# Patient Record
Sex: FEMALE | Race: WHITE
[De-identification: names, ages, dates, MRNs, and addresses within clinical notes are randomized per-mention and may not be internally consistent; named-entity substitution may affect disease eponyms.]

---

## 2019-08-28 NOTE — HP
PREOPERATIVE HISTORY AND PHYSICAL:

 

DATE OF SURGERY/ADMISSION:  08/30/19

 

DATE OF OFFICE VISIT/ENCOUNTER:  08/28/19

 

ATTENDING SURGEON:  Reanna Rincon MD * (DICTATED BY HOLGER GOLDEN)

 

PROCEDURE:  Right wrist de Quervain's release.

 

HISTORY OF PRESENT ILLNESS:  This is a 58-year-old female who complains of pain 
at the radial aspect of her right wrist.  This has been ongoing since fall of 2018. She is employed as a  person and does a lot of typing and this 
is aggravating.  She has failed conservative treatment including bracing and 
cortisone injections and has now consented to proceed with surgical 
intervention for this problem.

 

PAST MEDICAL HISTORY:

1.  Hypertension.

2.  Hyperlipidemia.

3.  History of diverticulitis.

4.  Osteoarthritis.

5.  Migraine headaches.

6.  Thyroid goiter.

7.  Sleep apnea, she does not wear a CPAP.

 

PAST SURGICAL HISTORY:

1.  D and C.

2.  Bilateral carpal tunnel releases.

3.  Removal of an ovary.

4.  Hysterectomy.

 

CURRENT MEDICATIONS:

1.  Amitriptyline HCl 10 mg 3 tabs daily.

2.  Atenolol 30 mg daily.

3.  Pravastatin sodium 10 mg daily.

 

ALLERGIES:  ADHESIVE causes a skin reaction and QUININE causes fevers, chills, 
nausea, vomiting.

 

FAMILY MEDICAL HISTORY:  Diabetes, heart disease, hypertension, and cancer.

 

SOCIAL HISTORY:  The patient is employed as a  at [employer].  
She is a former smoker, she quit in 1984; prior to that, she smoked a pack a 
day for 7 years.  She denies recreational drug use and does not drink alcohol.

 

REVIEW OF SYSTEMS:  Negative for general, cephalic, cardiovascular, respiratory
, GI, , other musculoskeletal, integumentary, endocrine, neurologic, and 
hematologic symptoms.  Infectious Disease:  Negative for MRSA, hepatitis C, HIV.

 

                               PHYSICAL EXAMINATION

 

GENERAL:  Well-developed, well-nourished 58-year-old female, in no acute 
distress.

 

VITAL SIGNS:  Height 5 feet 4 inches, weight 280 pounds, pulse rate 68, blood 
pressure 128/76.

 

HEENT:  Normocephalic and atraumatic.  Pupils are equal, round, and reactive to 
light and accommodation.  Extraocular movements are intact.  Throat is clear.

 

NECK:  Supple.  No palpable lymph nodes.

 

PULMONARY:  Lungs are clear to auscultation bilaterally.  No wheezes, rales, or 
rhonchi.

 

CARDIOVASCULAR:  Regular rate and rhythm.  S1 and S2.  No murmurs, rubs, or 
gallops.  No edema.

 

ABDOMEN:  Positive bowel sounds.  Soft and nontender.

 

NEUROLOGIC:  Alert and oriented x3.  Cranial nerves II through XII are intact. 
Sensation is intact to light touch.

 

MUSCULOSKELETAL:  On exam of her right wrist, there is no visible swelling.  
Skin is intact.  She has tenderness to palpation at the radial styloid and over 
the first dorsal compartment.  Mild increase in pain with some extension and 
passive wrist ulnar deviation.  She has a positive Finkelstein test.  
Neurovascular function is intact.

 

 IMPRESSION:  Right wrist de Quervain's tenosynovitis.

 

PLAN/RECOMMENDATIONS:  The patient is scheduled to undergo a right wrist de 
Quervain's release with Dr. Rincon on 08/30/19.  She will return to the office 
10 days postop for followup and suture removal.  A prescription for Ultracet 
was e- scribed to the patient's pharmacy for postoperative pain management.

 

 ____________________________________ HOLGER GOLDEN

 

[id number]/CPS #: [id number]

[id number]

## 2019-08-30 ENCOUNTER — HOSPITAL ENCOUNTER (OUTPATIENT)
Dept: HOSPITAL 25 - OR | Age: 59
Discharge: HOME | End: 2019-08-30
Attending: ORTHOPAEDIC SURGERY
Payer: COMMERCIAL

## 2019-08-30 VITALS — DIASTOLIC BLOOD PRESSURE: 67 MMHG | SYSTOLIC BLOOD PRESSURE: 119 MMHG

## 2019-08-30 DIAGNOSIS — E78.5: ICD-10-CM

## 2019-08-30 DIAGNOSIS — I10: ICD-10-CM

## 2019-08-30 DIAGNOSIS — E66.01: ICD-10-CM

## 2019-08-30 DIAGNOSIS — M19.90: ICD-10-CM

## 2019-08-30 DIAGNOSIS — Z87.891: ICD-10-CM

## 2019-08-30 DIAGNOSIS — G47.30: ICD-10-CM

## 2019-08-30 DIAGNOSIS — M65.4: Primary | ICD-10-CM

## 2019-08-30 NOTE — OP
CC:  Dr. Reanna Rincon*

 

OPERATIVE PROCEDURE:

 

DATE OF OPERATION:  08/30/19 - \[facility]\""

 

DATE OF BIRTH:  [birth year]

 

SURGEON:  Reanna Rincon MD

 

ASSISTANT:  HOLGER Curtis



ANESTHESIOLOGIST:  Helen Taveras MD

 

ANESTHESIA:  Local MAC.

 

PRE-OP DIAGNOSIS:  De Quervain tenosynovitis on the right.

 

POST-OP DIAGNOSIS:  De Quervain tenosynovitis on the right.

 

OPERATIVE PROCEDURE:  Right de Quervain's release.

 

ESTIMATED BLOOD LOSS:  Zero.

 

TOURNIQUET TIME:  About 10 minutes.

 

INDICATIONS:  Nila is a 58-year-old female who has pain on the radial 
aspect of her right wrist.  She has failed conservative treatment with 
cortisone and presents now for de Quervain's release on the right.

 

DESCRIPTION OF PROCEDURE:  The patient was brought into the operating room, was 
given a sedation anesthetic and a local infiltration of 10 cc of 1% plain 
lidocaine at the radial aspect of her right wrist.  The skin of her left hand 
and forearm was prepped and draped in the usual sterile fashion.  The hand and 
forearm were exsanguinated and the tourniquet elevated to 250 mmHg.  A 
longitudinal incision was made centered at the tip of the radial styloid and we 
dissected through the subcutaneous tissue down to the first dorsal compartment.
  The compartment was incised longitudinally completely releasing the APL and 
EPB tendons which were in separate compartments.  The wound was irrigated and 
the skin edges were reapproximated with 4-0 nylon suture.  The wound was 
dressed with Xeroform, 4x4, Webril, and an Ace wrap.  The patient tolerated the 
procedure well and was brought to the recovery room in good condition.

 

 [id number]/[facility] #: [id number]

[facility]

## 2019-09-10 ENCOUNTER — HOSPITAL ENCOUNTER (EMERGENCY)
Dept: HOSPITAL 25 - ED | Age: 59
LOS: 1 days | Discharge: HOME | End: 2019-09-11
Payer: COMMERCIAL

## 2019-09-10 DIAGNOSIS — G43.909: ICD-10-CM

## 2019-09-10 DIAGNOSIS — L03.211: ICD-10-CM

## 2019-09-10 DIAGNOSIS — K05.219: Primary | ICD-10-CM

## 2019-09-10 DIAGNOSIS — Z88.8: ICD-10-CM

## 2019-09-10 DIAGNOSIS — Z87.891: ICD-10-CM

## 2019-09-10 DIAGNOSIS — I10: ICD-10-CM

## 2019-09-10 DIAGNOSIS — Z91.048: ICD-10-CM

## 2019-09-10 LAB
ALBUMIN SERPL BCG-MCNC: 3.9 G/DL (ref 3.2–5.2)
ALBUMIN/GLOB SERPL: 1.1 {RATIO} (ref 1–3)
ALP SERPL-CCNC: 68 U/L (ref 34–104)
ALT SERPL W P-5'-P-CCNC: 14 U/L (ref 7–52)
ANION GAP SERPL CALC-SCNC: 7 MMOL/L (ref 2–11)
AST SERPL-CCNC: 18 U/L (ref 13–39)
BASOPHILS # BLD AUTO: 0.1 10^3/UL (ref 0–0.2)
BUN SERPL-MCNC: 14 MG/DL (ref 6–24)
BUN/CREAT SERPL: 17.5 (ref 8–20)
CALCIUM SERPL-MCNC: 9.3 MG/DL (ref 8.6–10.3)
CHLORIDE SERPL-SCNC: 98 MMOL/L (ref 101–111)
EOSINOPHIL # BLD AUTO: 0.1 10^3/UL (ref 0–0.6)
GLOBULIN SER CALC-MCNC: 3.7 G/DL (ref 2–4)
GLUCOSE SERPL-MCNC: 123 MG/DL (ref 70–100)
HCO3 SERPL-SCNC: 29 MMOL/L (ref 22–32)
HCT VFR BLD AUTO: 45 % (ref 35–47)
HGB BLD-MCNC: 15.2 G/DL (ref 12–16)
LYMPHOCYTES # BLD AUTO: 2 10^3/UL (ref 1–4.8)
MCH RBC QN AUTO: 30 PG (ref 27–31)
MCHC RBC AUTO-ENTMCNC: 34 G/DL (ref 31–36)
MCV RBC AUTO: 89 FL (ref 80–97)
MONOCYTES # BLD AUTO: 1.1 10^3/UL (ref 0–0.8)
NEUTROPHILS # BLD AUTO: 8.6 10^3/UL (ref 1.5–7.7)
NRBC # BLD AUTO: 0 10^3/UL
NRBC BLD QL AUTO: 0
PLATELET # BLD AUTO: 296 10^3/UL (ref 150–450)
POTASSIUM SERPL-SCNC: 3.6 MMOL/L (ref 3.5–5)
PROT SERPL-MCNC: 7.6 G/DL (ref 6.4–8.9)
RBC # BLD AUTO: 5.1 10^6 /UL (ref 3.7–4.87)
SODIUM SERPL-SCNC: 134 MMOL/L (ref 135–145)
WBC # BLD AUTO: 11.9 10^3/UL (ref 3.5–10.8)

## 2019-09-10 PROCEDURE — 87040 BLOOD CULTURE FOR BACTERIA: CPT

## 2019-09-10 PROCEDURE — 96361 HYDRATE IV INFUSION ADD-ON: CPT

## 2019-09-10 PROCEDURE — 85025 COMPLETE CBC W/AUTO DIFF WBC: CPT

## 2019-09-10 PROCEDURE — 70487 CT MAXILLOFACIAL W/DYE: CPT

## 2019-09-10 PROCEDURE — 36415 COLL VENOUS BLD VENIPUNCTURE: CPT

## 2019-09-10 PROCEDURE — 80053 COMPREHEN METABOLIC PANEL: CPT

## 2019-09-10 PROCEDURE — 96375 TX/PRO/DX INJ NEW DRUG ADDON: CPT

## 2019-09-10 PROCEDURE — 96374 THER/PROPH/DIAG INJ IV PUSH: CPT

## 2019-09-10 PROCEDURE — 83605 ASSAY OF LACTIC ACID: CPT

## 2019-09-10 PROCEDURE — 86140 C-REACTIVE PROTEIN: CPT

## 2019-09-10 PROCEDURE — 99283 EMERGENCY DEPT VISIT LOW MDM: CPT

## 2019-09-10 NOTE — ED
Throat Pain/Nasal Congestion





- HPI Summary


HPI Summary: 





Patient complains of redness and swelling along the right side jaw and chin 5 

days.  Seen by PCP and placed on amoxicillin.  Patient has taken 3 days of 

amoxicillin with no improvement.  Patient evaluated by dentist who gave patient 

no clinical diagnosis.  Denies fever, cough, sore throat, CP, SOB, N/V/V 

abdominal pain, change in urine, change in BM.  Denies medical history.





- History of Current Complaint


Chief Complaint: EDDentalPain


Time Seen by Provider: 09/10/19 22:53


Hx Obtained From: Patient


Onset/Duration: Gradual Onset, Lasting Days


Severity: Severe


Associated Signs And Symptoms: Positive: Negative


Cough: None





- Allergies/Home Medications


Allergies/Adverse Reactions: 


 Allergies











Allergy/AdvReac Type Severity Reaction Status Date / Time


 


quinine Allergy Severe CHILLS, Verified 09/10/19 23:09





   VOMITING,  





   DIARRHEA  


 


Adhesive Tape Allergy Intermediate Rash Verified 09/10/19 23:09


 


CARDBOARD BOXES Allergy Severe RASH ON Uncoded 09/10/19 23:09





   ARMS AND  





   HANDS,  





   welts  














PMH/Surg Hx/FS Hx/Imm Hx


Endocrine/Hematology History: 


   Denies: Hx Anticoagulant Therapy


Cardiovascular History: Reports: Hx Hypertension


Respiratory History: Reports: Hx Sleep Apnea


GI History: Reports: Other GI Disorders - DIVERTICULITIS


Musculoskeletal History: Reports: Hx Arthritis - LOWER BACK, LEFT ANKLE, 

BILATERAL WRIST, Hx Tendonitis - right wrist


Sensory History: 


   Denies: Hx Contacts or Glasses, Hx Hearing Aid


Opthamlomology History: 


   Denies: Hx Contacts or Glasses


EENT History: 


   Denies: Hx Deafness


Neurological History: Reports: Hx Migraine - ON MEDICAITON





- Cancer History


Hx Chemotherapy: No


Hx Radiation Therapy: No





- Surgical History


Surgery Procedure, Year, and Place: 2011 LAPAROSCOPIC HYSTERECTOMY, [place].  

SEVERAL DILATION AND CURETTAGE, X 8 - [facility].  1999 RIGHT CARPAL TUNNEL RELEASE, 

[facility].  1999 RIGHT OOPHORECTOMY, [facility].  left carpal tunnel release approx 2012


Hx Anesthesia Reactions: No


Infectious Disease History: No


Infectious Disease History: 


   Denies: Traveled Outside the [place] in Last 30 Days





- Family History


Known Family History: Positive: Non-Contributory





- Social History


Alcohol Use: Rare


Substance Use Type: Reports: None


Smoking Status (MU): Former Smoker


Amount Used/How Often: 6-7 years 1ppd





Review of Systems


Constitutional: Negative


Eyes: Negative


ENT: Negative


Cardiovascular: Negative


Respiratory: Negative


Gastrointestinal: Negative


Genitourinary: Negative


Musculoskeletal: Negative


Skin: Negative


Neurological: Negative


Psychological: Normal


All Other Systems Reviewed And Are Negative: Yes





Physical Exam





- Summary


Physical Exam Summary: 





Extensive Redness and swelling along right side jaw, right-sided chin and under 

chin.  Full range of motion of jaw.  No evidence of intraoral lesions or apical 

abscess.


Triage Information Reviewed: Yes


Vital Signs On Initial Exam: 


 Initial Vitals











Temp Pulse Resp BP Pulse Ox


 


 100.4 F   98   18   120/98   94 


 


 09/10/19 20:48  09/10/19 20:48  09/10/19 20:48  09/10/19 20:48  09/10/19 20:48











Vital Signs Reviewed: Yes


Appearance: Positive: Well-Appearing


Skin: Positive: Warm


Head/Face: Positive: Normal Head/Face Inspection


Eyes: Positive: Normal


ENT: Positive: Normal ENT inspection


Neck: Positive: Supple


Respiratory/Lung Sounds: Positive: Clear to Auscultation


Cardiovascular: Positive: Normal


Abdomen Description: Positive: Nontender


Musculoskeletal: Positive: Normal


Neurological: Positive: Normal


Psychiatric: Positive: Normal


AVPU Assessment: Alert





- Barbara Coma Scale


Best Eye Response: 4 - Spontaneous


Best Motor Response: 6 - Obeys Commands


Best Verbal Response: 5 - Oriented


Coma Scale Total: 15





Diagnostics





- Vital Signs


 Vital Signs











  Temp Pulse Resp BP Pulse Ox


 


 09/10/19 20:48  100.4 F  98  18  120/98  94














- Laboratory


Result Diagrams: 


 09/10/19 23:15





 09/10/19 23:15


Lab Statement: Any lab studies that have been ordered have been reviewed, and 

results considered in the medical decision making process.





EENT Course/Dx





- Course


Course Of Treatment: Patient complains of redness and swelling along the right 

side jaw and chin 5 days.  Seen by PCP and placed on amoxicillin.  Patient has 

taken 3 days of amoxicillin with no improvement.  Patient evaluated by dentist 

who gave patient no clinical diagnosis.  Denies fever, cough, sore throat, CP, 

SOB, N/V/V abdominal pain, change in urine, change in BM.  Denies medical 

history.  Febrile at 100.4.  Heart rate 98.  Vital signs otherwise within 

normal limits.  WBC 11.9.  .  Labs otherwise unremarkable.  CT 

maxillofacial positive for inflammatory fat stranding in the region of the chin 

including a right submandibular space surrounding the submandibular gland 

suspicious for cellulitis but cannot exclude right submandibular siladenitis.  

No visible discrete abscess.  There is periapical lucency surrounding the right 

second mandibular molar tooth consistent with periodontal disease/periodontist 

abscess.  Discussed patient with attending Dr. Chen who agreed patient could 

be discharged with by mouth clindamycin.  Patient started here in the ED on 

clindamycin by mouth.  Advised to follow-up with dentist.  Patient understands 

and approves of plan.





- Diagnoses


Provider Diagnoses: 


 Periodontal abscess, Cellulitis








Discharge ED





- Sign-Out/Discharge


Documenting (check all that apply): Patient Departure


Patient Received Moderate/Deep Sedation with Procedure: No





- Discharge Plan


Condition: Stable


Disposition: HOME


Prescriptions: 


Clindamycin HCl 450 mg PO TID 7 Days #21 capsule


Oxycodone HCl 5 mg PO TID 2 Days #6 tablet MDD 3 tabs


Patient Education Materials:  Dental Abscess (ED), Cellulitis (ED)


Referrals: 


Power Lamas MD [Primary Care Provider] - 


Additional Instructions: 


Take antibiotics as directed.  Follow up with your dentist.  Take oxycodone for 

pain.  Take ibuprofen with the oxycodone for anti-inflammatory properties.  

Return to the ED for any new or worsening symptoms.





- Billing Disposition and Condition


Condition: STABLE


Disposition: Home

## 2019-09-10 NOTE — XMS REPORT
Continuity of Care Document (CCD)

 Created on:2019



Patient:Nila Milner

Sex:Female

:1960

External Reference #:[id number]





Demographics







 Address  [address] [place] [address]

 

 Home Phone  [phone]

 

 Work Phone  [phone]

 

 Email Address  [email or web address]

 

 Preferred Language  en

 

 Marital Status  Not  or 

 

 Pentecostalism Affiliation  Unknown

 

 Race  White

 

 Ethnic Group  Not  or 









Author







 Name  Reanna Rincon M.D. (transmitted by agent of provider Quintin Hurley)

 

 Address  [address] [place] [address]



   [place] [address]









Care Team Providers







 Name  Role  Phone

 

 Power Lamas MD - Family Medicine  Care Team Information   [phone]
[phone]









Problems







 Active Problems  Provider  Date

 

 Migraine without aura, not refractory  Chito Eddy MD  Onset: 2016







Social History







 Type  Date  Description  Comments

 

 Birth Sex    Unknown  

 

 ETOH Use    Denies alcohol use  

 

 Tobacco Use  Start: Unknown End:  Patient is a former smoker  



   Unknown    

 

 Smoking Status  Reviewed: 19  Patient is a former smoker  

 

 Exercise Type/Frequency    Exercises sporadically  







Allergies, Adverse Reactions, Alerts







 Active Allergies  Reaction  Severity  Comments  Date

 

 Quinine        2018

 

 Adhesive        10/04/2018









 Inactive Allergies









 NKDA        2015







Medications







 Active Medications  SIG  Qnty  Indications  Ordering  Date



         Provider  

 

 Tramadol  1 tab by mouth  15tabs    Reanna Rincon,  2019



 Hydrochloride/Acetami  every 4-6 hours      MARCELLO soto  as needed pain        



      37.5-325mg          



 Tablets          



           

 

 Amitriptyline HCL  3 tabs by mouth  270tabs    Chito Eddy,  2018



                 10mg  at bedtime      MD  



 Tablets          



           

 

 Cefaly Kit  Dual Kit,as  1units    Chito Eddy,  2018



           Device  directed      MD  



           

 

 Pravastatin Sodium  1 by mouth every      Unknown  



                  10mg  day        



 Tablets          



           

 

 Atenolol  30mg      Unknown  

 

 Amoxicillin  take 4 pills, 2 g      Unknown  



           500mg  1 hour before        



 Capsules  dental or gi        



   procedure        







Medications Administered in Office







 Medication  SIG  Qnty  Indications  Ordering Provider  Date

 

 Depomedrol 40MG        NATHANIEL Curtis  2019



      Injection          

 

 Depomedrol 40MG        Dana Bitting, RPA-C  2018



      Injection          

 

 Depomedrol 40MG        Dana Bitting, CHRISTIANE-C  10/04/2018



      Injection          







Immunizations







 Description

 

 No Information Available







Vital Signs







 Date  Vital  Result  Comment

 

 2019  8:15am  Height  64 inches  5'4"









 Weight  280.00 lb  

 

 Heart Rate  94 /min  

 

 Body Temperature  99.0 F  

 

 O2 % BldC Oximetry  98 %  

 

 BMI (Body Mass Index)  48.1 kg/m2  









 2019  9:13am  Height  64 inches  5'4"









 Weight  280.00 lb  

 

 Heart Rate  68 /min  

 

 BP Systolic  128 mmHg  

 

 BP Diastolic  76 mmHg  

 

 BMI (Body Mass Index)  48.1 kg/m2  







Results







 Description

 

 No Information Available







Procedures







 Date  Code  Description  Status

 

 2019  07194  Inject Tendon Sheath Or Ligament Aponeurosis Eg Plantar  
Completed



     Fascia  







Medical Devices







 Description

 

 No Information Available







Encounters







 Type  Date  Location  Provider  Dx  Diagnosis

 

 Office Visit  2019  [facility]  Radial styloid



   9:15a  [facility]    tenosynovitis [de



           Quervain]







Assessments







 Date  Code  Description  Provider

 

 2019  [id number]  Radial styloid tenosynovitis [de Quervain]  Reanna Rincon M.D.

 

 2019  [facility]  Radial styloid tenosynovitis [de Quervain]  Reanna Rincon M.D.

 

 2019  M65.4  Radial styloid tenosynovitis [de Quervain]  NATHANIEL Curtis







Plan of Treatment

Future Appointment(s):10/09/2019  3:30 pm - [facility]con M.D. [facility] - Reanna Rincon M.D.M65.Zak Radial styloid 
tenosynovitis [de Quervain]Follow up:Follow up:   4 weeks



Functional Status







 Description

 

 No Information Available







Mental Status







 Description

 

 No Information Available







Referrals







 Description

 

 No Information Available

## 2019-09-10 NOTE — XMS REPORT
Continuity of Care Document (CCD)

 Created on:2019



Patient:Nila Milner

Sex:Female

:1960

External Reference #:[id number]





Demographics







 Address  [address] [place] [address]

 

 Home Phone  [phone]

 

 Work Phone  [phone]

 

 Email Address  [email or web address]

 

 Preferred Language  en

 

 Marital Status  Not  or 

 

 Mormon Affiliation  Unknown

 

 Race  White

 

 Ethnic Group  Not  or 









Author







 Name  Reanna Rincon M.D. (transmitted by agent of provider Tara Landrum)

 

 Address  [address] [place] [id number]









Care Team Providers







 Name  Role  Phone

 

 Power Lamas MD - Family Medicine  Care Team Information   [phone]
[phone]









Problems







 Active Problems  Provider  Date

 

 Migraine without aura, not refractory  Chito Eddy MD  Onset: 2016







Social History







 Type  Date  Description  Comments

 

 Birth Sex    Unknown  

 

 ETOH Use    Denies alcohol use  

 

 Tobacco Use  Start: Unknown End:  Patient is a former smoker  



   Unknown    

 

 Smoking Status  Reviewed: 19  Patient is a former smoker  

 

 Exercise Type/Frequency    Exercises sporadically  







Allergies, Adverse Reactions, Alerts







 Active Allergies  Reaction  Severity  Comments  Date

 

 Quinine        2018

 

 Adhesive        10/04/2018









 Inactive Allergies









 NKDA        2015







Medications







 Active Medications  SIG  Qnty  Indications  Ordering  Date



         Provider  

 

 Amitriptyline HCL  3 tabs by mouth  270tabs    Chito Eddy,  2018



                10mg  at bedtime      MD  



 Tablets          



           

 

 Cefaly Kit  Dual Kit,as  1units    Chito Eddy,  2018



          Device  directed      MD  



           

 

 Pravastatin Sodium  1 by mouth every      Unknown  



                 10mg  day        



 Tablets          



           

 

 Atenolol  30mg      Unknown  







Medications Administered in Office







 Medication  SIG  Qnty  Indications  Ordering Provider  Date

 

 Depomedrol 40MG        Dana Bitting, RPA-C  2019



      Injection          

 

 Depomedrol 40MG        Dana Bitting, RPA-C  2018



      Injection          

 

 Depomedrol 40MG        Dana Bitting, RPA-C  10/04/2018



      Injection          







Immunizations







 Description

 

 No Information Available







Vital Signs







 Date  Vital  Result  Comment

 

 2019  9:13am  Height  64 inches  5'4"









 Weight  280.00 lb  

 

 Heart Rate  68 /min  

 

 BP Systolic  128 mmHg  

 

 BP Diastolic  76 mmHg  

 

 BMI (Body Mass Index)  48.1 kg/m2  









 2019  2:56pm  Height  64 inches  5'4"









 Weight  281.00 lb  

 

 BP Systolic  122 mmHg  

 

 BP Diastolic  84 mmHg  

 

 Pain Level  7  upon exertion

 

 BMI (Body Mass Index)  48.2 kg/m2  







Results







 Description

 

 No Information Available







Procedures







 Date  Code  Description  Status

 

 2019  06209  Inject Tendon Sheath Or Ligament Aponeurosis Eg Plantar  
Completed



     Fascia  







Medical Devices







 Description

 

 No Information Available







Encounters







 Description

 

 No Information Available







Assessments







 Date  Code  Description  Provider

 

 2019  M65.4  Radial styloid tenosynovitis [de Quervain]  Reanna Rincon M.D.

 

 2019  M65.4  Radial styloid tenosynovitis [de Quervain]  NATHANIEL Curtis







Plan of Treatment

Future Appointment(s):2019  8:15 am [facility] - Reanna Rincon M.D.M65.4 Radial styloid 
tenosynovitis [de Quervain]Follow up:Follow up:   9-10 days postop



Functional Status







 Description

 

 No Information Available







Mental Status







 Description

 

 No Information Available







Referrals







 Description

 

 No Information Available

## 2019-09-10 NOTE — XMS REPORT
Continuity of Care Document (CCD)

 Created on:2019



Patient:Nila Milner

Sex:Female

:1960

External Reference #:[id number]





Demographics







 [place] [address]

 

 Home Phone  [phone]

 

 Email Address  [email or web address]

 

 Preferred Language  en

 

 Marital Status  Not  or 

 

 [employer] Affiliation  Unknown

 

 Race  White

 

 Ethnic Group  Not  or 









Author







 Name  Power Lamas M.D.

 

 Address  [address] [place] [address]









Care Team Providers







 Name  Role  Phone

 

 Power Lamas MD - Family Medicine  Care Team Information   [phone]
[phone]









Problems







 Active Problems  Provider  Date

 

 Benign essential hypertension  Power Lamas M.D.  Onset: 2008

 

 Hyperlipidemia  Power Lamas M.D.  Onset: 2008

 

 Family history of malignant neoplasm of  Power Lamas M.D.  Onset: 2008



 gastrointestinal tract    

 

 Obstructive sleep apnea syndrome  Power Lamas M.D.  Onset: 10/14/2011

 

 Goiter  Power Lamas M.D.  Onset: 2012

 

 Obesity  Power Lamas M.D.  Onset: 2015

 

 Headache  Power Lamas M.D.  Onset: 2015

 

 Idiopathic peripheral neuropathy  Power Lamas M.D.  Onset: 10/04/2016

 

 Mixed hyperlipidemia  Power Lamas M.D.  Onset: 2019

 

 Radial styloid tenosynovitis  Power Lamas M.D.  Onset: 2019

 

 Lumbar radiculopathy  Power Lamas M.D.  Onset: 2017







Social History







 Type  Date  Description  Comments

 

 Birth Sex    Unknown  

 

 Tobacco Use  Start: Unknown End: Unknown  Former Cigarette Smoker  

 

 Tobacco Use  Start: Unknown End: Unknown  Patient is a former smoker  

 

 Smoking Status  Reviewed: 19  Patient is a former smoker  







Allergies, Adverse Reactions, Alerts







 Active Allergies  Reaction  Severity  Comments  Date

 

 Quinine      N/V,Fever, Chills  2003

 

 Seasonal        2011

 

 Environmental        2011

 

 Adhesives        2011







Medications







 Active Medications  SIG  Qnty  Indications  Ordering Provider  Date

 

 Atenolol  Take 1 Tablet  90tabs    Power CYNTHIA. Cydney,  2012



      50mg Tablets  Daily.      M.D.  



           

 

 Pravastatin Sodium  Take 1 Tablet  90tabs    Power F. Cydney,  10/28/2011



                10mg  Daily      M.D.  



 Tablets          



           

 

 Mometasone Furoate  apply three  45gm    Power F. Cydney,  2010



                0.1%  times a day as      M.D.  



 Cream  needed        

 

 Amitriptyline 30 MG  once a day      Unknown  







Immunizations







 CPT Code  Status  Date  Vaccine  Lot #

 

 [id number]  Given  2017  Influenza Vac, Quadrivalent, Slit Virus, Im  

 

 85788  Given  10/04/2016  Influenza Vac, Quadrivalent, Slit Virus, Im  [id number]

 

 92309  Given  2015  Influenza Vac, Quadrivalent, Slit Virus, Im  [id number]

 

 53344  Given  2013  Tdap Tetanus, W Pertussis  [id number]

 

 10614  Given  2013  DO Not Use Split Influenza Virus Vaccine  [id number]

 

 04252  Given  10/14/2011  DO Not Use Split Influenza Virus Vaccine  [id number]

 

 39853  Given  2010  DO Not Use Split Influenza Virus Vaccine  [id number]







Vital Signs







 Date  Vital  Result  Comment

 

 2019  3:45pm  BP Systolic  120 mmHg  









 BP Diastolic  70 mmHg  

 

 Heart Rate  82 /min  

 

 Body Temperature  97.7 F  

 

 Respiratory Rate  20 /min  

 

 Height  63 inches  5'3"

 

 Weight  281.00 lb  

 

 BMI (Body Mass Index)  49.8 kg/m2  









 2018  4:25pm  BP Systolic  120 mmHg  









 BP Diastolic  70 mmHg  

 

 Heart Rate  76 /min  

 

 Body Temperature  97.7 F  

 

 Height  63 inches  5'3"

 

 Weight  273.00 lb  

 

 BMI (Body Mass Index)  48.4 kg/m2  







Results







 Description

 

 No Information Available







Procedures







 Date  Code  Description  Status

 

 2019  73624  Electrocardiogram Complete  Completed

 

 2017  59439280  Mammogram  Completed

 

 2016  49632083  Colonoscopy  Completed

 

 2015  46247786  Mammogram  Completed

 

 06/10/2013  58968972  Mammogram  Completed

 

 2012  97220741  Mammogram  Completed

 

 2010  18019353  Mammogram  Completed

 

 2009  15266606  Colonoscopy  Completed

 

 2009  03167284  Mammogram  Completed

 

 10/31/2008  44611155  Mammogram  Completed

 

 2007  80975849  Mammogram  Completed

 

 2006  09281045  Mammogram  Completed







[employer] Devices







 Description

 

 No Information Available







Encounters







 Description

 

 No Information Available







Assessments







 Date  Code  Description  Provider

 

 2019  I10  Essential (primary) hypertension  Power Lamas M.D.

 

 2019  E78.49  Other hyperlipidemia  Power Lamas M.D.

 

 2019  M65.4  Radial styloid tenosynovitis [de Quervain]  Power Lamas M.D.

 

 2019  G47.33  Obstructive sleep apnea (adult) (pediatric)  Power Lamas M.D.







Plan of Treatment

Future Appointment(s):09/10/2019  7:00 pm - Power Lamas M.D. at [facility] 
[id number] - Power Lamas M.D.I10 Essential (primary) 
hypertensionNew Labs:Comp Metabolic-[facility], Ordered: 19CB 
Electronic-[facility], Ordered: 19TSH ([facility]), Ordered: Comments:continue current medication, call if bp elevation is persistent 
above 140/90E78.49 Other hyperlipidemiaNew Labs:Comp Metabolic-[facility], 
Ordered: 19CBC Electronic-[facility], Ordered: 19TSH ([facility]/
[facility]), Ordered: 19Lipid Panel-[facility], Ordered: Comments:continue present medication,recheck of lipids,liver function 
ghcxmA13.4 Radial styloid tenosynovitis [de Quervain]Comments:I feel she is 
medically clear for the planned procedure tomorrow by Dr RinconG47.33 
Obstructive sleep apnea (adult) (pediatric)Comments:not currently on cpap, 
unable to tolerateAllFollow up:return for cpe



Functional Status







 Description

 

 No Information Available







Mental Status







 Description

 

 No Information Available







Referrals







 Description

 

 No Information Available

## 2019-09-11 VITALS — DIASTOLIC BLOOD PRESSURE: 86 MMHG | SYSTOLIC BLOOD PRESSURE: 150 MMHG
